# Patient Record
Sex: MALE | Race: WHITE
[De-identification: names, ages, dates, MRNs, and addresses within clinical notes are randomized per-mention and may not be internally consistent; named-entity substitution may affect disease eponyms.]

---

## 2020-09-25 ENCOUNTER — HOSPITAL ENCOUNTER (EMERGENCY)
Dept: HOSPITAL 8 - ED | Age: 55
Discharge: HOME | End: 2020-09-25
Payer: COMMERCIAL

## 2020-09-25 VITALS — WEIGHT: 187.39 LBS | BODY MASS INDEX: 24.05 KG/M2 | HEIGHT: 74 IN

## 2020-09-25 VITALS — SYSTOLIC BLOOD PRESSURE: 127 MMHG | DIASTOLIC BLOOD PRESSURE: 92 MMHG
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
## 2020-09-25 DIAGNOSIS — G89.29: ICD-10-CM
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
## 2020-09-25 DIAGNOSIS — R51: ICD-10-CM

## 2020-09-25 DIAGNOSIS — M25.511: Primary | ICD-10-CM

## 2020-09-25 DIAGNOSIS — R55: ICD-10-CM
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
## 2020-09-25 DIAGNOSIS — F15.129: ICD-10-CM

## 2020-09-25 LAB
ALBUMIN SERPL-MCNC: 3.4 G/DL (ref 3.4–5)
ALP SERPL-CCNC: 95 U/L (ref 45–117)
ALT SERPL-CCNC: 29 U/L (ref 12–78)
ANION GAP SERPL CALC-SCNC: 5 MMOL/L (ref 5–15)
BASOPHILS # BLD AUTO: 0.01 X10^3/UL (ref 0–0.1)
BASOPHILS NFR BLD AUTO: 0 % (ref 0–1)
BILIRUB SERPL-MCNC: 0.9 MG/DL (ref 0.2–1)
CALCIUM SERPL-MCNC: 8.9 MG/DL (ref 8.5–10.1)
CHLORIDE SERPL-SCNC: 109 MMOL/L (ref 98–107)
CREAT SERPL-MCNC: 0.91 MG/DL (ref 0.7–1.3)
EOSINOPHIL # BLD AUTO: 0.11 X10^3/UL (ref 0–0.4)
EOSINOPHIL NFR BLD AUTO: 3 % (ref 1–7)
ERYTHROCYTE [DISTWIDTH] IN BLOOD BY AUTOMATED COUNT: 15.1 % (ref 9.4–14.8)
LYMPHOCYTES # BLD AUTO: 1.15 X10^3/UL (ref 1–3.4)
LYMPHOCYTES NFR BLD AUTO: 33 % (ref 22–44)
MCH RBC QN AUTO: 26.8 PG (ref 27.5–34.5)
MCHC RBC AUTO-ENTMCNC: 33 G/DL (ref 33.2–36.2)
MD: NO
MICROSCOPIC: (no result)
MONOCYTES # BLD AUTO: 0.29 X10^3/UL (ref 0.2–0.8)
MONOCYTES NFR BLD AUTO: 8 % (ref 2–9)
NEUTROPHILS # BLD AUTO: 1.98 X10^3/UL (ref 1.8–6.8)
NEUTROPHILS NFR BLD AUTO: 56 % (ref 42–75)
PLATELET # BLD AUTO: 147 X10^3/UL (ref 130–400)
PMV BLD AUTO: 8.5 FL (ref 7.4–10.4)
PROT SERPL-MCNC: 7.4 G/DL (ref 6.4–8.2)
RBC # BLD AUTO: 5.56 X10^6/UL (ref 4.38–5.82)
VANCOMYCIN TROUGH SERPL-MCNC: < 1.7 MG/DL (ref 2.8–20)

## 2020-09-25 PROCEDURE — 87086 URINE CULTURE/COLONY COUNT: CPT

## 2020-09-25 PROCEDURE — 81001 URINALYSIS AUTO W/SCOPE: CPT

## 2020-09-25 PROCEDURE — 99285 EMERGENCY DEPT VISIT HI MDM: CPT

## 2020-09-25 PROCEDURE — 80053 COMPREHEN METABOLIC PANEL: CPT

## 2020-09-25 PROCEDURE — 36415 COLL VENOUS BLD VENIPUNCTURE: CPT

## 2020-09-25 PROCEDURE — 70450 CT HEAD/BRAIN W/O DYE: CPT

## 2020-09-25 PROCEDURE — 71045 X-RAY EXAM CHEST 1 VIEW: CPT

## 2020-09-25 PROCEDURE — 80307 DRUG TEST PRSMV CHEM ANLYZR: CPT

## 2020-09-25 PROCEDURE — 85025 COMPLETE CBC W/AUTO DIFF WBC: CPT

## 2020-09-25 NOTE — NUR
PATIENT DOMENICO HUBBARD WITH CHIEF C/O GLF THIS MORNING DUE TO PATIENT PASSING OUT, 
PATIENT HIT THE FRONT OF HIS HEAD ON THE TOILET.  PATIENT TOOK IBUPROFEN AT 
HOME BEFORE EMS ARRIVED. PATIENT C/O LOW BACK PAIN X7 WEEKS, R-SHOULDER PAIN 
AND FRONT HEAD PAIN, CURRENT PAIN LEVEL IS 5/10. CONNECTED TO VITAL SIGN 
MACHINE, NO ACUTE SIGNS OF DISTRESS, CALL LIGHT WITHIN REACH, SIDE RAILS UP X2. 

-------------------------------------------------------------------------------

Addendum: 09/25/20 at 1241 by MATRAAnu

-------------------------------------------------------------------------------

PATIENT IS [id number].

## 2020-09-25 NOTE — NUR
PATIENT RESTING IN GURNEY, CONNECTED TO VITAL SIGN MACHINE, EASILY AROUSED, 
PATIENT STATES HE REMEMBERS WHY HE FELL "I WAS DISORIENTED BECAUSE MY BACK 
HURT." NO ACUTE SIGNS OF DISTRESS, CALL LIGHT WITHIN REACH, SIDE RAILS UP X2.

## 2020-09-25 NOTE — NUR
Patient given discharge instructions, clothing, and bus pass and they have 
confirmed that they understand the instructions, all questions answered.  
Patient wheeled to discharge desk, where he then ambulated out of ER lobby to 
bus stop.

## 2020-09-25 NOTE — NUR
Break RN note: Pt provided crackers and water per request, ok per Dr. Riley. 
PIV dc'd with tip intact per MD, pt to be dc'd home. Pt provided education 
about importance of not using meth anymore.